# Patient Record
Sex: FEMALE | Race: WHITE | NOT HISPANIC OR LATINO | Employment: OTHER | URBAN - METROPOLITAN AREA
[De-identification: names, ages, dates, MRNs, and addresses within clinical notes are randomized per-mention and may not be internally consistent; named-entity substitution may affect disease eponyms.]

---

## 2023-10-19 ENCOUNTER — MEDICAL CORRESPONDENCE (OUTPATIENT)
Dept: HEALTH INFORMATION MANAGEMENT | Facility: CLINIC | Age: 28
End: 2023-10-19

## 2023-10-20 ENCOUNTER — TRANSCRIBE ORDERS (OUTPATIENT)
Dept: OTHER | Age: 28
End: 2023-10-20

## 2023-10-20 DIAGNOSIS — H81.20 VESTIBULAR NEURONITIS, UNSPECIFIED LATERALITY: Primary | ICD-10-CM

## 2023-10-20 DIAGNOSIS — R20.0 LEFT LEG NUMBNESS: ICD-10-CM

## 2024-02-28 ENCOUNTER — TELEPHONE (OUTPATIENT)
Dept: NEUROLOGY | Facility: CLINIC | Age: 29
End: 2024-02-28
Payer: COMMERCIAL

## 2024-02-28 NOTE — PROGRESS NOTES
INITIAL NEUROLOGY CONSULTATION    DATE OF VISIT: 2/29/2024  CLINIC LOCATION: [facility]  MRN: [id number]  PATIENT NAME: Missy Burns  YOB: 1995    REASON FOR VISIT: No chief complaint on file.    HISTORY OF PRESENT ILLNESS:                                                    Ms. Missy Burns is 28 year old right handed female patient with past medical history of hypothyroidism, polycystic ovarian syndrome, fatigue, endometriosis, depression and irritable bowel syndrome,, who was seen today for left leg numbness.    Per patient's report/review of past medical records, she was seen by Dr. Rogers ([facility] neurology) in August 2023 for constellation of neurological symptoms, including imbalance, left leg numbness, and right eye pain.  At that time she reported that her symptoms started while she was playing volleyball at the end of June 2022.  She experienced a sensation of moving to the right along with right facial numbness.  At that time she was evaluated in the emergency room and was diagnosed with vestibular neuronitis.  She also experienced left leg numbness and sensation of coldness.  She noticed that her right eye is droopy and painful at times.      Evaluation included cervical/thoracic spine MRI, brain MRI, and labs (ANCA vasculitis panel, JULES, centromere IgG, cryoglobulins, CCP, double-stranded DNA, MERON, folate, heavy metal screen, hep B, hep C, homocystine, methylmalonic acid, myasthenia gravis evaluation with reflex MuSK, B6, rheumatoid factor, Ro 52 and 60, SCL 70, sed rate, SSA/SSB, anti-Smith antibodies, smooth muscle antibodies, thiamine, B12, and vitamin E).  Lab workup was unrevealing, except elevated TSH that normalized on recheck after starting thyroid replacement.  EMG of left lower extremity from 8/9/2022 was normal.  Lumbar puncture from 10/7/2022 demonstrated negative flow cytometry, normal ACE level, cell count, glucose, protein, negative  for meningitis/encephalitis panel, cytology for malignant cells, Lyme, and paraneoplastic panel.    According to Care Everywhere, TSH (2.1), free T4 (1.0), ACTH (10) FSH, LH, prolactin, and free testosterone levels were normal in September 2023.  Cortisol level was normal in July 2023.  CMP was unrevealing at that time.  CBC was normal in June 2023 together with amylase and lipase, celiac serology, and TTG.    Brain MRI with and without contrast from 8/23/2023 demonstrated partially empty sella turcica, but no other abnormalities, including abnormal contrast-enhancement.  Cervical/thoracic spine MRI with and without contrast from the same date was unrevealing.  PAST MEDICAL/SURGICAL HISTORY:                                                    I personally reviewed patient's past medical and surgical history with the patient at today's visit.  MEDICATIONS:                                                    I personally reviewed patient's medications and allergies with the patient at today's visit.  ALLERGIES:                                                    Not on File  EXAM:                                                    VITAL SIGNS:   There were no vitals taken for this visit.  Mini-Cog Assessment:       General: pt is in NAD, cooperative.  Skin: normal turgor, moist mucous membranes, no lesions/rashes noticed.  HEENT: ATNC, EOMI, PERRL, white sclera, normal conjunctiva, no nystagmus or ptosis. No carotid bruits bilaterally.  Respiratory: lung sounds clear to auscultation bilaterally, no crackles, wheezes, rhonchi. Symmetric lung excursion, no accessory respiratory muscle use.  Cardiovascular: normal S1/S2, no murmurs/rubs/gallops.   Abdomen: Not distended.  : deferred.    Neurological:  Mental: alert, follows commands,  /5 with ***/3 on memory recall, no aphasia or dysarthria. Fund of knowledge is {MYAPPROPRIATE:103089}  Cranial Nerves:  CN II: visual acuity - able to accurately count fingers with each eye.  Visual fields intact, fundi: discs sharp, no papilledema and normal vessels bilaterally.  CN III, IV, VI: EOM intact, pupils equal and reactive  CN V: facial sensation nl  CN VII: face symmetric, no facial droop  CN VIII: hearing normal  CN IX: palate elevation symmetric, uvula at midline  CN XI SCM normal, shoulder shrug nl  CN XII: tongue midline  Motor: Strength: 5/5 in all major groups of all extremities. Normal tone. No abnormal movements. No pronator drift b/l.  Reflexes: Triceps, biceps, brachioradialis, patellar, and achilles reflexes normal and symmetric. No clonus noted. Toes are down-going b/l.   Sensory: light touch, pinprick, and vibration intact. Romberg: negative.  Coordination: FNF and heel-shin tests intact b/l.   Gait:  Normal, able to tandem walk *** without difficulty.  DATA:   LABS/EEG/IMAGING/OTHER STUDIES: I reviewed pertinent medical records, as detailed in the history of present illness.  ASSESSMENT AND PLAN:      ASSESSMENT: Missy Burns is a 28 year old female patient with listed above past medical history, who presents with ***.    We had a detailed discussion with the patient regarding her presenting complaints.  The neurological exam today is ***.    DIAGNOSES:  No diagnosis found.  PLAN: There are no Patient Instructions on file for this visit.    Total Time: *** minutes spent on the date of the encounter doing chart review, history and exam, documentation and further activities per the note.    Dhiraj Fox MD  [facility] Neurology  (Chart documentation was completed in part with Dragon voice-recognition software. Even though reviewed, some grammatical, spelling, and word errors may remain.)

## 2024-02-28 NOTE — TELEPHONE ENCOUNTER
Attempted to reach patient to remind them about appointment scheduled with Dhiraj Fox MD on 2/29/24 in our [place] location.  A voicemail was left with a call back number if the patient has questions or would like to reschedule.

## 2024-02-28 NOTE — TELEPHONE ENCOUNTER
Left appointment reminder message for patient's neurology visit on 2-29-24 with Dr. Fox.    Patient was advised to call [phone] if the appointment needed to be rescheduled.

## 2024-02-29 ENCOUNTER — OFFICE VISIT (OUTPATIENT)
Dept: NEUROLOGY | Facility: CLINIC | Age: 29
End: 2024-02-29
Attending: NURSE PRACTITIONER
Payer: COMMERCIAL

## 2024-02-29 VITALS
BODY MASS INDEX: 33.36 KG/M2 | SYSTOLIC BLOOD PRESSURE: 134 MMHG | WEIGHT: 200.2 LBS | DIASTOLIC BLOOD PRESSURE: 84 MMHG | HEIGHT: 65 IN | OXYGEN SATURATION: 96 % | HEART RATE: 78 BPM

## 2024-02-29 DIAGNOSIS — R20.2 PARESTHESIA OF LEFT LOWER EXTREMITY: ICD-10-CM

## 2024-02-29 DIAGNOSIS — G90.9 AUTONOMIC DYSFUNCTION: Primary | ICD-10-CM

## 2024-02-29 PROCEDURE — 99205 OFFICE O/P NEW HI 60 MIN: CPT | Performed by: PSYCHIATRY & NEUROLOGY

## 2024-02-29 RX ORDER — LANOLIN ALCOHOL/MO/W.PET/CERES
3 CREAM (GRAM) TOPICAL
COMMUNITY

## 2024-02-29 RX ORDER — LEVOTHYROXINE SODIUM 88 UG/1
1 TABLET ORAL DAILY
COMMUNITY
Start: 2023-09-21

## 2024-02-29 RX ORDER — BUSPIRONE HYDROCHLORIDE 7.5 MG/1
1 TABLET ORAL 2 TIMES DAILY
COMMUNITY
Start: 2023-12-11

## 2024-02-29 RX ORDER — HYDROXYZINE HYDROCHLORIDE 25 MG/1
25-50 TABLET, FILM COATED ORAL
COMMUNITY
Start: 2023-09-22

## 2024-02-29 RX ORDER — MULTIVITAMIN
1 TABLET ORAL DAILY
COMMUNITY

## 2024-02-29 RX ORDER — SUMATRIPTAN 50 MG/1
50 TABLET, FILM COATED ORAL
COMMUNITY
Start: 2023-06-21

## 2024-02-29 RX ORDER — VENLAFAXINE HYDROCHLORIDE 37.5 MG/1
37.5 CAPSULE, EXTENDED RELEASE ORAL DAILY
COMMUNITY
Start: 2024-01-26

## 2024-02-29 RX ORDER — NORGESTIMATE AND ETHINYL ESTRADIOL 0.25-0.035
1 KIT ORAL DAILY
COMMUNITY
Start: 2023-06-21

## 2024-02-29 RX ORDER — LIOTHYRONINE SODIUM 5 UG/1
1 TABLET ORAL DAILY
COMMUNITY
Start: 2023-04-21

## 2024-02-29 NOTE — PROGRESS NOTES
INITIAL NEUROLOGY CONSULTATION    DATE OF VISIT: 2/29/2024  CLINIC LOCATION: [facility]  MRN: [id number]  PATIENT NAME: Missy Burns  YOB: 1995    REASON FOR VISIT:   Chief Complaint   Patient presents with     Consult     Left lower extremity- numb/no feeling can not detect hot/cold/pain   Muscle Twitching- lower extremities bilateral , accompanied by a shock sensation   Migraine  Right side head/facial pain - short sharp bursts of pain       HISTORY OF PRESENT ILLNESS:                                                    Ms. Missy Burns is 28 year old right handed female patient with past medical history of hypothyroidism, polycystic ovarian syndrome, fatigue, endometriosis, depression and irritable bowel syndrome, who was seen today for the constellation of neurological symptoms (second opinion).    Per patient's report/review of past medical records, she was seen by Dr. Rogers ([facility] neurology) in August 2023 for constellation of neurological symptoms, including imbalance, left leg numbness, and right eye pain.  At that time she reported that her symptoms started while she was playing volleyball at the end of May 2022.  She experienced a sensation of moving to the right along with right facial numbness.  At that time she was evaluated in the emergency room and was diagnosed with vestibular neuronitis.  She also experienced left leg numbness and sensation of coldness.  She noticed that her right eye is droopy and painful at times.    Today, she brought a list of her symptoms and the timeline.  Muscle tightness and pain, brain fog, intermittent confusion, restless sensation, constipation, bowel urgency/bladder urgency, sharp pain in the right side of the head, intense rush of adrenaline (caused by loud noises), tingling, rash in the groin area, cramping sensation in forehead, blurry eyes were more prevalent in September 2023.  At the present time, she  "reports temperature dysregulation (either dripping sweat or freezing cold), intense fatigue (sleeps 9+ hours at night and needs nap during the day, also falls asleep while driving and working), \"odd temperature and pain sensation in the left leg\", shocklike feeling and twitching in the legs, intense joint aches in the right leg, weight gain, sharp pain on the right side of the head and jaw, bowel incontinence, dizziness, and right eye droop.    Family history is positive for migraine, dementia, Parkinson's disease, and myasthenia gravis.    Evaluation included cervical/thoracic spine MRI, brain MRI, and labs (ANCA vasculitis panel, JULES, centromere IgG, cryoglobulins, CCP, double-stranded DNA, MERON, folate, heavy metal screen, hep B, hep C, homocystine, methylmalonic acid, myasthenia gravis evaluation with reflex MuSK, B6, rheumatoid factor, Ro 52 and 60, SCL 70, sed rate, SSA/SSB, anti-Smith antibodies, smooth muscle antibodies, thiamine, B12, and vitamin E).  Lab workup was unrevealing, except elevated TSH that normalized on recheck after starting thyroid replacement.      EMG of left lower extremity from 8/9/2022 was normal.      Lumbar puncture from 10/7/2022 demonstrated negative flow cytometry, normal ACE level, cell count, glucose, protein, negative for meningitis/encephalitis panel, cytology for malignant cells, Lyme, and paraneoplastic panel.    According to Care Everywhere, TSH (2.1), free T4 (1.0), ACTH (10) FSH, LH, prolactin, and free testosterone levels were normal in September 2023.  Cortisol level was normal in July 2023.  CMP was unrevealing at that time.  CBC was normal in June 2023 together with amylase and lipase, celiac serology, and TTG.    Brain MRI with and without contrast from 8/23/2023 demonstrated partially empty sella turcica, but no other abnormalities, including abnormal contrast-enhancement.  Cervical/thoracic spine MRI with and without contrast from the same date was unrevealing.  I " "was able to review images in PACS and personally interpreted them.  I agree with official report.  PAST MEDICAL/SURGICAL HISTORY:                                                    I personally reviewed patient's past medical and surgical history with the patient at today's visit.  MEDICATIONS:                                                    I personally reviewed patient's medications and allergies with the patient at today's visit.  ALLERGIES:                                                    No Known Allergies  EXAM:                                                    VITAL SIGNS:   BP (!) 149/102 (BP Location: Right arm, Patient Position: Sitting, Cuff Size: Adult Regular)   Pulse 79   Ht 1.651 m (5' 5\")   Wt 90.8 kg (200 lb 3.2 oz)   SpO2 98%   BMI 33.32 kg/m    Mini-Cog Assessment:  Mini Cog Assessment  Clock Draw Score: 2 Normal  3 Item Recall: 3 objects recalled  Mini Cog Total Score: 5  Administered by: : Leisa CASTILLO    General: pt is in NAD, cooperative.  Skin: normal turgor, moist mucous membranes, no lesions/rashes noticed.  HEENT: ATNC, EOMI, PERRL, white sclera, normal conjunctiva, no nystagmus or ptosis. No carotid bruits bilaterally.  Respiratory: lung sounds clear to auscultation bilaterally, no crackles, wheezes, rhonchi. Symmetric lung excursion, no accessory respiratory muscle use.  Cardiovascular: normal S1/S2, no murmurs/rubs/gallops.   Abdomen: Not distended.  : deferred.    Neurological:  Mental: alert, follows commands, Mini Cog Total Score: 5/5 with 3/3 on memory recall, no aphasia or dysarthria. Fund of knowledge is appropriate for age.  Cranial Nerves:  CN II: visual acuity - able to accurately count fingers with each eye. Visual fields intact, fundi: discs sharp, no papilledema and normal vessels bilaterally.  CN III, IV, VI: EOM intact, pupils equal and reactive  CN V: facial sensation nl  CN VII: face symmetric, no facial droop  CN VIII: hearing normal  CN IX: palate elevation " symmetric, uvula at midline  CN XI SCM normal, shoulder shrug nl  CN XII: tongue midline  Motor: Strength: 5/5 in all major groups of all extremities. Normal tone. No abnormal movements. No pronator drift b/l.  Reflexes: Triceps, biceps, brachioradialis, patellar, and achilles reflexes normal and symmetric. No clonus noted. Toes are down-going b/l.   Sensory: light touch/pinprick is reduced on the lateral side of the left thigh and entire lower leg/foot, and vibration intact. Romberg: negative.  Coordination: FNF and heel-shin tests intact b/l.   Gait:  Normal, able to tandem walk without difficulty.  DATA:   LABS/EEG/IMAGING/OTHER STUDIES: I reviewed pertinent medical records, as detailed in the history of present illness.  ASSESSMENT AND PLAN:      ASSESSMENT: Missy Burns is a 28 year old female patient with listed above past medical history, who presents with constellation of neurological symptoms, including left leg paresthesias, with negative to date extensive neurologic workup.    We had a detailed discussion with the patient regarding her presenting complaints.  The neurological exam today is nonfocal, except reduction in light touch/pinprick sensation in the left leg that does not follow anatomic boundaries.  We discussed that patient had quite extensive workup, including repeated brain, cervical/thoracic spine MRIs, EMG of the left lower extremity, and extensive laboratory workup that was unrevealing.  I do not have good explanation on her ongoing left leg symptoms.  At the same time, her other symptoms are suggestive of possibility of autonomic dysfunction that could be best addressed by one of the autonomic nervous system specialists.  I will place a referral to .    Missy to follow up with Primary Care provider regarding elevated blood pressure.     DIAGNOSES:    ICD-10-CM    1. Autonomic dysfunction  G90.9 Adult Neurology  Referral      2. Left leg numbness  R20.0 Adult  Neurology  Referral        PLAN: At today's visit we thoroughly discussed current symptoms, previous neurologic evaluation results, and the plan.    We decided to seek an opinion from autonomic specialist, Dr. Vásquez.      No additional recommendations.    Next follow-up appointment is on as needed basis.    Total Time: 61 minutes spent on the date of the encounter doing chart review, history and exam, documentation and further activities per the note.    Dhiraj Fox MD  [facility]  (Chart documentation was completed in part with Dragon voice-recognition software. Even though reviewed, some grammatical, spelling, and word errors may remain.)

## 2024-02-29 NOTE — PATIENT INSTRUCTIONS
AFTER VISIT SUMMARY (AVS):    At today's visit we thoroughly discussed current symptoms, previous neurologic evaluation results, and the plan.    We decided to seek an opinion from autonomic specialist, Dr. Vásquez.      No additional recommendations.    Next follow-up appointment is on as needed basis.    Please do not hesitate to call me with any questions or concerns.    Thanks.

## 2024-02-29 NOTE — PROGRESS NOTES
"Missy Burns is a 28 year old female who presents for:  Chief Complaint   Patient presents with    Consult     Left lower extremity- numb/no feeling can not detect hot/cold/pain   Muscle Twitching- lower extremities bilateral , accompanied by a shock sensation   Migraine  Right side head/facial pain - short sharp bursts of pain          Initial Vitals:  BP (!) 149/102 (BP Location: Right arm, Patient Position: Sitting, Cuff Size: Adult Regular)   Pulse 79   Ht 1.651 m (5' 5\")   Wt 90.8 kg (200 lb 3.2 oz)   SpO2 98%   BMI 33.32 kg/m   Estimated body mass index is 33.32 kg/m  as calculated from the following:    Height as of this encounter: 1.651 m (5' 5\").    Weight as of this encounter: 90.8 kg (200 lb 3.2 oz).. Body surface area is 2.04 meters squared. BP completed using cuff size: regular    Leisa Kelley   "